# Patient Record
Sex: FEMALE | Race: OTHER | HISPANIC OR LATINO | Employment: FULL TIME | URBAN - METROPOLITAN AREA
[De-identification: names, ages, dates, MRNs, and addresses within clinical notes are randomized per-mention and may not be internally consistent; named-entity substitution may affect disease eponyms.]

---

## 2024-11-13 ENCOUNTER — HOSPITAL ENCOUNTER (EMERGENCY)
Facility: MEDICAL CENTER | Age: 29
End: 2024-11-13
Attending: EMERGENCY MEDICINE

## 2024-11-13 ENCOUNTER — TELEPHONE (OUTPATIENT)
Dept: OBGYN | Facility: CLINIC | Age: 29
End: 2024-11-13

## 2024-11-13 ENCOUNTER — PATIENT OUTREACH (OUTPATIENT)
Dept: SCHEDULING | Facility: IMAGING CENTER | Age: 29
End: 2024-11-13

## 2024-11-13 ENCOUNTER — APPOINTMENT (OUTPATIENT)
Dept: RADIOLOGY | Facility: MEDICAL CENTER | Age: 29
End: 2024-11-13
Attending: EMERGENCY MEDICINE

## 2024-11-13 VITALS
SYSTOLIC BLOOD PRESSURE: 110 MMHG | RESPIRATION RATE: 14 BRPM | HEART RATE: 77 BPM | DIASTOLIC BLOOD PRESSURE: 67 MMHG | OXYGEN SATURATION: 95 % | WEIGHT: 151.9 LBS | TEMPERATURE: 98.1 F

## 2024-11-13 DIAGNOSIS — O20.0 THREATENED MISCARRIAGE: ICD-10-CM

## 2024-11-13 LAB
ALBUMIN SERPL BCP-MCNC: 3.9 G/DL (ref 3.2–4.9)
ALBUMIN/GLOB SERPL: 1.2 G/DL
ALP SERPL-CCNC: 72 U/L (ref 30–99)
ALT SERPL-CCNC: 14 U/L (ref 2–50)
ANION GAP SERPL CALC-SCNC: 7 MMOL/L (ref 7–16)
APPEARANCE UR: CLEAR
AST SERPL-CCNC: 16 U/L (ref 12–45)
B-HCG SERPL-ACNC: ABNORMAL MIU/ML (ref 0–5)
BACTERIA #/AREA URNS HPF: ABNORMAL /HPF
BASOPHILS # BLD AUTO: 0.3 % (ref 0–1.8)
BASOPHILS # BLD: 0.02 K/UL (ref 0–0.12)
BILIRUB SERPL-MCNC: <0.2 MG/DL (ref 0.1–1.5)
BILIRUB UR QL STRIP.AUTO: NEGATIVE
BUN SERPL-MCNC: 15 MG/DL (ref 8–22)
C TRACH DNA SPEC QL NAA+PROBE: NEGATIVE
CALCIUM ALBUM COR SERPL-MCNC: 9.2 MG/DL (ref 8.5–10.5)
CALCIUM SERPL-MCNC: 9.1 MG/DL (ref 8.5–10.5)
CASTS URNS QL MICRO: ABNORMAL /LPF (ref 0–2)
CHLORIDE SERPL-SCNC: 105 MMOL/L (ref 96–112)
CO2 SERPL-SCNC: 21 MMOL/L (ref 20–33)
COLOR UR: YELLOW
CREAT SERPL-MCNC: 0.72 MG/DL (ref 0.5–1.4)
EOSINOPHIL # BLD AUTO: 0.09 K/UL (ref 0–0.51)
EOSINOPHIL NFR BLD: 1.3 % (ref 0–6.9)
EPITHELIAL CELLS 1715: ABNORMAL /HPF (ref 0–5)
ERYTHROCYTE [DISTWIDTH] IN BLOOD BY AUTOMATED COUNT: 47.5 FL (ref 35.9–50)
GFR SERPLBLD CREATININE-BSD FMLA CKD-EPI: 116 ML/MIN/1.73 M 2
GLOBULIN SER CALC-MCNC: 3.2 G/DL (ref 1.9–3.5)
GLUCOSE SERPL-MCNC: 79 MG/DL (ref 65–99)
GLUCOSE UR STRIP.AUTO-MCNC: NEGATIVE MG/DL
HCT VFR BLD AUTO: 39.7 % (ref 37–47)
HGB BLD-MCNC: 12.8 G/DL (ref 12–16)
HIV 1+2 AB+HIV1 P24 AG SERPL QL IA: NORMAL
IMM GRANULOCYTES # BLD AUTO: 0.03 K/UL (ref 0–0.11)
IMM GRANULOCYTES NFR BLD AUTO: 0.4 % (ref 0–0.9)
KETONES UR STRIP.AUTO-MCNC: NEGATIVE MG/DL
LEUKOCYTE ESTERASE UR QL STRIP.AUTO: ABNORMAL
LIPASE SERPL-CCNC: 22 U/L (ref 11–82)
LYMPHOCYTES # BLD AUTO: 1.95 K/UL (ref 1–4.8)
LYMPHOCYTES NFR BLD: 28.7 % (ref 22–41)
MCH RBC QN AUTO: 27.1 PG (ref 27–33)
MCHC RBC AUTO-ENTMCNC: 32.2 G/DL (ref 32.2–35.5)
MCV RBC AUTO: 84.1 FL (ref 81.4–97.8)
MICRO URNS: ABNORMAL
MONOCYTES # BLD AUTO: 0.39 K/UL (ref 0–0.85)
MONOCYTES NFR BLD AUTO: 5.7 % (ref 0–13.4)
N GONORRHOEA DNA SPEC QL NAA+PROBE: NEGATIVE
NEUTROPHILS # BLD AUTO: 4.32 K/UL (ref 1.82–7.42)
NEUTROPHILS NFR BLD: 63.6 % (ref 44–72)
NITRITE UR QL STRIP.AUTO: NEGATIVE
NRBC # BLD AUTO: 0 K/UL
NRBC BLD-RTO: 0 /100 WBC (ref 0–0.2)
NUMBER OF RH DOSES IND 8505RD: NORMAL
PH UR STRIP.AUTO: 5.5 [PH] (ref 5–8)
PLATELET # BLD AUTO: 265 K/UL (ref 164–446)
PMV BLD AUTO: 9.4 FL (ref 9–12.9)
POTASSIUM SERPL-SCNC: 4.1 MMOL/L (ref 3.6–5.5)
PROT SERPL-MCNC: 7.1 G/DL (ref 6–8.2)
PROT UR QL STRIP: NEGATIVE MG/DL
RBC # BLD AUTO: 4.72 M/UL (ref 4.2–5.4)
RBC # URNS HPF: ABNORMAL /HPF (ref 0–2)
RBC UR QL AUTO: ABNORMAL
RH BLD: NORMAL
SODIUM SERPL-SCNC: 133 MMOL/L (ref 135–145)
SP GR UR STRIP.AUTO: 1.02
SPECIMEN SOURCE: NORMAL
T PALLIDUM AB SER QL IA: NORMAL
UROBILINOGEN UR STRIP.AUTO-MCNC: 0.2 EU/DL
WBC # BLD AUTO: 6.8 K/UL (ref 4.8–10.8)
WBC #/AREA URNS HPF: ABNORMAL /HPF

## 2024-11-13 PROCEDURE — 85025 COMPLETE CBC W/AUTO DIFF WBC: CPT

## 2024-11-13 PROCEDURE — 86901 BLOOD TYPING SEROLOGIC RH(D): CPT

## 2024-11-13 PROCEDURE — 84702 CHORIONIC GONADOTROPIN TEST: CPT

## 2024-11-13 PROCEDURE — 76817 TRANSVAGINAL US OBSTETRIC: CPT

## 2024-11-13 PROCEDURE — 83690 ASSAY OF LIPASE: CPT

## 2024-11-13 PROCEDURE — 36415 COLL VENOUS BLD VENIPUNCTURE: CPT

## 2024-11-13 PROCEDURE — 81001 URINALYSIS AUTO W/SCOPE: CPT

## 2024-11-13 PROCEDURE — 99284 EMERGENCY DEPT VISIT MOD MDM: CPT

## 2024-11-13 PROCEDURE — 86780 TREPONEMA PALLIDUM: CPT

## 2024-11-13 PROCEDURE — 87389 HIV-1 AG W/HIV-1&-2 AB AG IA: CPT

## 2024-11-13 PROCEDURE — 87591 N.GONORRHOEAE DNA AMP PROB: CPT

## 2024-11-13 PROCEDURE — 87491 CHLMYD TRACH DNA AMP PROBE: CPT

## 2024-11-13 PROCEDURE — 80053 COMPREHEN METABOLIC PANEL: CPT

## 2024-11-13 ASSESSMENT — COGNITIVE AND FUNCTIONAL STATUS - GENERAL
DAILY ACTIVITIY SCORE: 24
SUGGESTED CMS G CODE MODIFIER DAILY ACTIVITY: CH
MOBILITY SCORE: 24
SUGGESTED CMS G CODE MODIFIER MOBILITY: CH

## 2024-11-13 ASSESSMENT — PAIN DESCRIPTION - PAIN TYPE: TYPE: ACUTE PAIN

## 2024-11-13 NOTE — DISCHARGE INSTRUCTIONS
Rest, no heavy lifting.  Follow-up with GYN in 1 to 2 days.  Return to the ER for more pain, more bleeding, if you have fever, or any other concerns.

## 2024-11-13 NOTE — ED PROVIDER NOTES
ED Provider Note    CHIEF COMPLAINT  Chief Complaint   Patient presents with    Vaginal Bleeding    Abdominal Pain    Painful Urination       EXTERNAL RECORDS REVIEWED  None available.    HPI/ROS  LIMITATION TO HISTORY   Language denies any pressure,  used.  OUTSIDE HISTORIAN(S):  None.    Latisha Fried is a 29 y.o. female G2, P1 presents to the emergency department for evaluation of vaginal bleeding and pain.  Started 3 days ago.  She has been vaginal bleeding and she describes and fluids coming out.  She states she has had some burning discomfort.  Initially was bright red blood.  Little heavier than a period yesterday and now is brown or blood.  She also some pain in her lower abdomen and back.  No fevers chills nausea or vomiting.  1 previous  delivery.  No previous tubal surgery or infections.    PAST MEDICAL HISTORY   Denies any past history.    SURGICAL HISTORY  patient denies any surgical history    FAMILY HISTORY  History reviewed. No pertinent family history.    SOCIAL HISTORY  Social History     Tobacco Use    Smoking status: Never    Smokeless tobacco: Never   Vaping Use    Vaping status: Never Used   Substance and Sexual Activity    Alcohol use: Never    Drug use: Never    Sexual activity: Not on file       CURRENT MEDICATIONS  Home Medications       Reviewed by Amisha Ibarra R.N. (Registered Nurse) on 24 at 0558  Med List Status: Partial     Medication Last Dose Status        Patient Ahsan Taking any Medications                         Audit from Redirected Encounters    **Home medications have not yet been reviewed for this encounter**         ALLERGIES  No Known Allergies    PHYSICAL EXAM  VITAL SIGNS: /64   Pulse 70   Temp 36.8 °C (98.2 °F) (Temporal)   Resp 16   Wt 68.9 kg (151 lb 14.4 oz)   LMP 10/04/2024 (Exact Date)   SpO2 98%    Constitutional: Well developed, Well nourished, No acute distress, Non-toxic appearance.   HENT: Normocephalic,  Atraumatic,  Eyes: PERRL, EOMI, Conjunctiva normal, No discharge.   Neck: Normal range of motion,  Cardiovascular: Normal heart rate, Normal rhythm, No murmurs, No rubs, No gallops.   Thorax & Lungs: Normal breath sounds, No respiratory distress, No wheezing,  Abdomen: Soft, No tenderness  Skin: Warm, Dry, No erythema, No rash.   Back: No tenderness, No CVA tenderness.   Musculoskeletal: Good range of motion in all major joints.  Neurologic: Alert, No focal deficits noted.   Psychiatric: Affect normal      EKG/LABS  Results for orders placed or performed during the hospital encounter of 11/13/24   CBC WITH DIFFERENTIAL    Collection Time: 11/13/24  6:30 AM   Result Value Ref Range    WBC 6.8 4.8 - 10.8 K/uL    RBC 4.72 4.20 - 5.40 M/uL    Hemoglobin 12.8 12.0 - 16.0 g/dL    Hematocrit 39.7 37.0 - 47.0 %    MCV 84.1 81.4 - 97.8 fL    MCH 27.1 27.0 - 33.0 pg    MCHC 32.2 32.2 - 35.5 g/dL    RDW 47.5 35.9 - 50.0 fL    Platelet Count 265 164 - 446 K/uL    MPV 9.4 9.0 - 12.9 fL    Neutrophils-Polys 63.60 44.00 - 72.00 %    Lymphocytes 28.70 22.00 - 41.00 %    Monocytes 5.70 0.00 - 13.40 %    Eosinophils 1.30 0.00 - 6.90 %    Basophils 0.30 0.00 - 1.80 %    Immature Granulocytes 0.40 0.00 - 0.90 %    Nucleated RBC 0.00 0.00 - 0.20 /100 WBC    Neutrophils (Absolute) 4.32 1.82 - 7.42 K/uL    Lymphs (Absolute) 1.95 1.00 - 4.80 K/uL    Monos (Absolute) 0.39 0.00 - 0.85 K/uL    Eos (Absolute) 0.09 0.00 - 0.51 K/uL    Baso (Absolute) 0.02 0.00 - 0.12 K/uL    Immature Granulocytes (abs) 0.03 0.00 - 0.11 K/uL    NRBC (Absolute) 0.00 K/uL   COMP METABOLIC PANEL    Collection Time: 11/13/24  6:30 AM   Result Value Ref Range    Sodium 133 (L) 135 - 145 mmol/L    Potassium 4.1 3.6 - 5.5 mmol/L    Chloride 105 96 - 112 mmol/L    Co2 21 20 - 33 mmol/L    Anion Gap 7.0 7.0 - 16.0    Glucose 79 65 - 99 mg/dL    Bun 15 8 - 22 mg/dL    Creatinine 0.72 0.50 - 1.40 mg/dL    Calcium 9.1 8.5 - 10.5 mg/dL    Correct Calcium 9.2 8.5 - 10.5 mg/dL     AST(SGOT) 16 12 - 45 U/L    ALT(SGPT) 14 2 - 50 U/L    Alkaline Phosphatase 72 30 - 99 U/L    Total Bilirubin <0.2 0.1 - 1.5 mg/dL    Albumin 3.9 3.2 - 4.9 g/dL    Total Protein 7.1 6.0 - 8.2 g/dL    Globulin 3.2 1.9 - 3.5 g/dL    A-G Ratio 1.2 g/dL   LIPASE    Collection Time: 11/13/24  6:30 AM   Result Value Ref Range    Lipase 22 11 - 82 U/L   HCG QUANTITATIVE    Collection Time: 11/13/24  6:30 AM   Result Value Ref Range    Bhcg 83376.0 (H) 0.0 - 5.0 mIU/mL   RH Type for Rhogam from E.D.    Collection Time: 11/13/24  6:30 AM   Result Value Ref Range    Emergency Department Rh Typing POS     Number Of Rh Doses Indicated ZERO    ESTIMATED GFR    Collection Time: 11/13/24  6:30 AM   Result Value Ref Range    GFR (CKD-EPI) 116 >60 mL/min/1.73 m 2   HIV AG/AB COMBO ASSAY SCREENING    Collection Time: 11/13/24  6:30 AM   Result Value Ref Range    HIV Ag/Ab Combo Assay Non-Reactive Non Reactive   T.PALLIDUM AB JOSE (SCREENING)    Collection Time: 11/13/24  6:30 AM   Result Value Ref Range    Syphilis, Treponemal Qual Non-Reactive Non-Reactive   URINALYSIS,CULTURE IF INDICATED    Collection Time: 11/13/24  7:18 AM    Specimen: Urine   Result Value Ref Range    Color Yellow     Character Clear     Specific Gravity 1.017 <1.035    Ph 5.5 5.0 - 8.0    Glucose Negative Negative mg/dL    Ketones Negative Negative mg/dL    Protein Negative Negative mg/dL    Bilirubin Negative Negative    Urobilinogen, Urine 0.2 <=1.0 EU/dL    Nitrite Negative Negative    Leukocyte Esterase Trace (A) Negative    Occult Blood Moderate (A) Negative    Micro Urine Req Microscopic    URINE MICROSCOPIC (W/UA)    Collection Time: 11/13/24  7:18 AM   Result Value Ref Range    WBC 0-2 /hpf    RBC 3-5 (A) 0 - 2 /hpf    Bacteria None Seen None /hpf    Epithelial Cells 0-2 0 - 5 /hpf    Urine Casts 0-2 0 - 2 /lpf   Chlamydia/GC, PCR (Urine)    Collection Time: 11/13/24 11:57 AM    Specimen: Urine   Result Value Ref Range    Source Urine       I have  independently interpreted this EKG    RADIOLOGY/PROCEDURES   I have independently interpreted the diagnostic imaging associated with this visit and am waiting the final reading from the radiologist.       Radiologist interpretation:  US-OB 1ST TRIMESTER WITH TRANSVAGINAL (COMBO)   Final Result      1.  Single living intrauterine pregnancy at 6 weeks1 day estimated gestational age.   2.  Somewhat low fetal heart rate and irregular appearing gestational sac, recommend close follow-up   3.  Large subchorionic hematoma   4.  No IUD seen          COURSE & MEDICAL DECISION MAKING    ASSESSMENT, COURSE AND PLAN  Care Narrative:     20-year-old female presents with first trimester bleeding.  Quantitative hCG is appropriate elevated.  STD tests are ordered per protocol.  Her HIV and syphilis are negative.  CBC CMP unremarkable.  Urinalysis does not show signs of UTI.    Patient's ultrasound shows an IUP but has a slow heart rate.  This may be a miscarriage in process.    Patient is a benign abdominal exam without tenderness or peritonitis doubt appendicitis.  There is no CVA tenderness.    At this point explained the patient think she is likely having a miscarriage also checking a threatened miscarriage.  She is asked to follow-up in 48 hours with OB/GYN.  In the interim to return for pain bleeding or other concerns return for recheck if unable to follow-up.    She is advised to pelvic rest and avoid heavy lifting and return to the ER for pain bleeding or other concerns.  Questions were answered, she is agreeable to plan.  She is referred to GYN and discharge condition.    Safia Barton M.D.  [address] [place] [address]  [phone]    Schedule an appointment as soon as possible for a visit in 2 days                DISPOSITION AND DISCUSSIONS        Barriers to care at this time, including but not limited to: Patient does not have established PCP.         FINAL DIAGNOSIS  1. Threatened miscarriage          Electronically signed by: Rizwan Mills M.D., 11/13/2024 7:30 AM

## 2024-11-13 NOTE — ED TRIAGE NOTES
29 y.o. female Latisha Fried  Chief Complaint   Patient presents with    Vaginal Bleeding    Abdominal Pain    Painful Urination   Patient presented to ED with complaints of abdominal pain , vaginal bleeding and dysuria for 3 days, patient endorses she has IUD in placed. Pt has history of previous  birth with .      2  Term Birth 0  Premature Births 1  Abortions 0  Living Children 1    LMP: 10/04/2024    Pregnancy confirmed with Home pregnancy kit.      OB/GYN: not established    Patient AAO X4 , Respirations even and unlabored on room air , afebrile at this time.     ED RN protocol initiated for Vaginal bleeding.       /78   Pulse 70   Temp 36.8 °C (98.2 °F) (Temporal)   Resp 18   Wt 68.9 kg (151 lb 14.4 oz)   LMP 10/04/2024 (Exact Date)   SpO2 99%     No Known Allergies    Pt made aware of triage process, placed back into lobby, educated pt to tell staff of any worsening of symptoms         2024     6:02 AM    SERVICES   Is patient using  services for this encounter? Yes   Language Interpreted Amharic    Name Zahida [id number]    Mode iPad   Refusal signed by patient? No   Content of Interpretation (select all) History/Visit information;Patient Education

## 2024-11-13 NOTE — ED NOTES
Bedside report with MATTY Espinoza. Pt connected to monitor, on room air.  Pt ambulatory to bathroom to provide urine sample and sample sent to lab.  Pt updated on POC. Call light within reach.

## 2024-11-13 NOTE — TELEPHONE ENCOUNTER
11/13/2024 1546  Prior to call, consulted with Dr Pike who stated that pt should f/u in a week or so in the clinic or at the ER. Ok to double book with her at [facility] on 11/26/2024 if pt wants to establish care with the clinic. Pt to do HBCG prior to appt, Dr Pike will order if pt wants to establish with the clinic.     ID: [id number]  Called pt x2, no answer and not able to LVM  Called Hermilo and SILVANA to have pt call clinic re: ER visit.    11/15/2024 1436   ID: [id number]   Pt stated that she is feeling better and resting, like the doctor told her. She would like to establish care with our clinic.  Asked pt if she has medical insurance. Pt stated that she just moved here and was supposed to start her job on Thursday, but went to the ER on Wednesday. She will talk to HR on Monday. Informed pt that if she has no insurance it will be $120 for the appt and then she will get a bill for any labs or procedures done. Pt agreed and verbalized understanding.  Scheduled for 11/26/2024 at 0830 with Dr Pike,  OK to double book per Dr Pike.  Informed pt that Dr Prieto would like her to get a lab draw HBCG, before her appt that will help us know how her pregnancy is progressing. Informed pt that she can get it drawn at any [facility] lab for $87-90 or she can go to [facility] for $47-50 - she will need a lab slip if she goes to [facility]. Pt can  lab slip on Monday afternoon or Tuesday as Dr Pike is not back in the clinic before Monday.Pt asked if she can get the lab drawn at [facility]. Informed pt that she can get the lab drawn there, but then she is responsible for getting the results to us. Pt agreed and verbalized understanding.

## 2024-11-13 NOTE — ED NOTES
Report given MATTY Velasquez. Endorsing care at this time .      How Severe Are Your Moles?: mild Has Your Mole Been Treated?: been treated Is This A New Presentation, Or A Follow-Up?: Mole Removal

## 2024-11-13 NOTE — ED NOTES
Rounded on pt. Pt resting comfortably in gurney with significant other at bedside. Sami interpretor used to update pt on POC.  Pt and significant other verbalized understanding. Denies any needs at this time. Call light within reach.

## 2024-11-15 DIAGNOSIS — Z34.90 EARLY STAGE OF PREGNANCY: ICD-10-CM

## 2024-11-20 ENCOUNTER — HOSPITAL ENCOUNTER (OUTPATIENT)
Dept: LAB | Facility: MEDICAL CENTER | Age: 29
End: 2024-11-20
Attending: OBSTETRICS & GYNECOLOGY

## 2024-11-20 DIAGNOSIS — Z34.90 EARLY STAGE OF PREGNANCY: ICD-10-CM

## 2024-11-20 LAB — B-HCG SERPL-ACNC: ABNORMAL MIU/ML (ref 0–5)

## 2024-11-20 PROCEDURE — 84702 CHORIONIC GONADOTROPIN TEST: CPT

## 2024-11-20 PROCEDURE — 36415 COLL VENOUS BLD VENIPUNCTURE: CPT

## 2024-11-22 ENCOUNTER — TELEPHONE (OUTPATIENT)
Dept: OBGYN | Facility: CLINIC | Age: 29
End: 2024-11-22

## 2024-11-22 NOTE — TELEPHONE ENCOUNTER
----- Message from Physician Tarsha Pike M.D. sent at 11/22/2024  8:02 AM PST -----  Do you mind letting Latisha know that her HCG is still rising, which supports that this pregnancy may be normal. However, the best way for us to determine this definitively will be an US which we can do th the appt with me next week.     If she has heavy bleeding (> 2pads/hr), severe abdominal pain, or concerns for infection, she should go to the ED before our visit.    ThanksTarsha    11/22/2024 0954   ID: [id number]  Called pt and informed as above. Pt agreed and verbalized understanding. She is planning to be at her appt on 11/26/2024.

## 2024-11-26 ENCOUNTER — OFFICE VISIT (OUTPATIENT)
Dept: OBGYN | Facility: CLINIC | Age: 29
End: 2024-11-26
Payer: COMMERCIAL

## 2024-11-26 ENCOUNTER — APPOINTMENT (OUTPATIENT)
Dept: ADMISSIONS | Facility: MEDICAL CENTER | Age: 29
End: 2024-11-26
Attending: OBSTETRICS & GYNECOLOGY
Payer: COMMERCIAL

## 2024-11-26 ENCOUNTER — ANESTHESIA EVENT (OUTPATIENT)
Dept: SURGERY | Facility: MEDICAL CENTER | Age: 29
End: 2024-11-26
Payer: COMMERCIAL

## 2024-11-26 VITALS
BODY MASS INDEX: 26.93 KG/M2 | DIASTOLIC BLOOD PRESSURE: 66 MMHG | SYSTOLIC BLOOD PRESSURE: 104 MMHG | WEIGHT: 152 LBS | HEIGHT: 63 IN

## 2024-11-26 DIAGNOSIS — O02.1 MISSED ABORTION: Primary | ICD-10-CM

## 2024-11-26 PROCEDURE — 3078F DIAST BP <80 MM HG: CPT | Performed by: OBSTETRICS & GYNECOLOGY

## 2024-11-26 PROCEDURE — 3074F SYST BP LT 130 MM HG: CPT | Performed by: OBSTETRICS & GYNECOLOGY

## 2024-11-26 PROCEDURE — 99203 OFFICE O/P NEW LOW 30 MIN: CPT | Performed by: OBSTETRICS & GYNECOLOGY

## 2024-11-26 RX ORDER — MISOPROSTOL 200 UG/1
TABLET ORAL
Qty: 8 TABLET | Refills: 0 | Status: ON HOLD | OUTPATIENT
Start: 2024-11-26 | End: 2024-11-27

## 2024-11-26 ASSESSMENT — FIBROSIS 4 INDEX: FIB4 SCORE: 0.47

## 2024-11-26 NOTE — PROGRESS NOTES
Patient here for GYN visit.   LMP : 10/4/24  Phone/Pharmacy verified: [phone]  ER  11/13 for vaginal bleeding, had US done - No IUD seen, IUP 6w1d (8w0d today) with irregular appearing gestational sac.   HCG levels rising, supporting normal pregnancy, will do US in office today     PT states she is wondering if she can continue working- I informed her I would ask due to this be an ER follow up and not a NOB appointment        ID [id number]

## 2024-11-26 NOTE — PROGRESS NOTES
[facility]   GYN VISIT NOTE    CC:   Gynecologic Exam (ER follow up )      HPI:   Patient is a 29 y.o.  who presents for follow-up from ED visit for vaginal bleeding in early pregnancy. LMP: 10/3/24    Seen in the ED on 24 for vaginal bleeding in early pregnancy, c/w threatened AB based on US results. Since then state hers bleeding has been light spotting bright not dark brown, but not heavy and no clot. Also having occasional pelvic cramping mostly at afternoon.     HCG trend: 24: 18,315 ---> : 43,802    US results:   1.  Single living intrauterine pregnancy at 6 weeks1 day estimated gestational age.   2.  Somewhat low fetal heart rate and irregular appearing gestational sac, recommend close follow-up   3.  Large subchorionic hematoma   4.  No IUD seen      Denies lightheadedness, dizziness,  and syncope or pre-syncope. Endorses occasional lightheadedness. Occasional nausea, no vomiting.       GYNECOLOGIC HISTORY:   Current Sexual Activity: Yes; # of partners: 1, Partners are:   History of sexually transmitted diseases? no  Abnormal discharge? No  IPV screen: negative     Menstrual History   Patient's last menstrual period was Patient's last menstrual period was 10/04/2024 (exact date).  Periods are regular  q 30 days, lasting 6 days.     Clots or heavy flow: no  Intermenstrual bleeding/spotting: no  Significant pelvic pain associated w/ menses: no  Significant pelvic pain NOT associated w/ menses: no    Contraception  Current contraception:  None     Pap History  Last Pap: unknown  Hx Moderate or Severe Dysplasia : No     OBSTETRIC HISTORY:  OB History    Para Term  AB Living   2 1 1     1   SAB IAB Ectopic Molar Multiple Live Births             1      # Outcome Date GA Lbr Oliver/2nd Weight Sex Type Anes PTL Lv   2 Current            1 Term 22 37w0d  4 lb 14.5 oz M CS-Unspec EPI N SILVA      Birth Comments: 8 month pregancy in [place] - had to stay  "inpatient due to delivery during Covid       MEDICAL HISTORY:  History reviewed. No pertinent past medical history.    SURGICAL HISTORY:  Past Surgical History:   Procedure Laterality Date    PRIMARY C SECTION         FAMILY HISTORY:  Family History   Problem Relation Age of Onset    No Known Problems Mother     No Known Problems Father        ALLERGIES / REACTIONS:  No Known Allergies    SOCIAL HISTORY:   reports that she has never smoked. She has never used smokeless tobacco. She reports that she does not drink alcohol and does not use drugs.    MEDICATIONS:  Current Outpatient Medications on File Prior to Visit   Medication Sig Dispense Refill    Prenatal MV-Min-Fe Fum-FA-DHA (PRENATAL 1 PO) Take  by mouth.       No current facility-administered medications on file prior to visit.     ROS:   Positive ROS: per HPI  Gen: no fevers or chills, no significant weight loss or gain, excessive fatigue  Respiratory:  no cough or dyspnea  Cardiac:  no chest pain, no palpitations, no syncope  GI:  no heartburn, no abdominal pain,  Urinary: no dysuria, urgency, frequency, incontinence   Psych: no depression or anxiety  Neuro: no migraines with aura, fainting spells, numbness or tingling  Extremities: no joint pain, persistently swollen ankles, recurrent leg cramps       PHYSICAL EXAMINATION:  Vital Signs:   Vitals:    11/26/24 0839   Weight: 152 lb   Height: 5' 2.99\"     Body mass index is 26.93 kg/m².  Constitutional: The patient is well developed and well nourished.  Psychiatric: Patient is oriented to time place and person.   Skin: No rash observed.  Neck: Neck appears symmetric.  Respiratory: normal effort  Abdomen: Soft, non-tender, no rebound or guarding  Pelvic Exam: deferred  Extremeties: Legs are symmetric and without tenderness. There is no edema present.    Labs:  Recent Results (from the past 2 weeks)   CBC WITH DIFFERENTIAL    Collection Time: 11/13/24  6:30 AM   Result Value Ref Range    WBC 6.8 4.8 - 10.8 K/uL "    RBC 4.72 4.20 - 5.40 M/uL    Hemoglobin 12.8 12.0 - 16.0 g/dL    Hematocrit 39.7 37.0 - 47.0 %    MCV 84.1 81.4 - 97.8 fL    MCH 27.1 27.0 - 33.0 pg    MCHC 32.2 32.2 - 35.5 g/dL    RDW 47.5 35.9 - 50.0 fL    Platelet Count 265 164 - 446 K/uL    MPV 9.4 9.0 - 12.9 fL    Neutrophils-Polys 63.60 44.00 - 72.00 %    Lymphocytes 28.70 22.00 - 41.00 %    Monocytes 5.70 0.00 - 13.40 %    Eosinophils 1.30 0.00 - 6.90 %    Basophils 0.30 0.00 - 1.80 %    Immature Granulocytes 0.40 0.00 - 0.90 %    Nucleated RBC 0.00 0.00 - 0.20 /100 WBC    Neutrophils (Absolute) 4.32 1.82 - 7.42 K/uL    Lymphs (Absolute) 1.95 1.00 - 4.80 K/uL    Monos (Absolute) 0.39 0.00 - 0.85 K/uL    Eos (Absolute) 0.09 0.00 - 0.51 K/uL    Baso (Absolute) 0.02 0.00 - 0.12 K/uL    Immature Granulocytes (abs) 0.03 0.00 - 0.11 K/uL    NRBC (Absolute) 0.00 K/uL   COMP METABOLIC PANEL    Collection Time: 11/13/24  6:30 AM   Result Value Ref Range    Sodium 133 (L) 135 - 145 mmol/L    Potassium 4.1 3.6 - 5.5 mmol/L    Chloride 105 96 - 112 mmol/L    Co2 21 20 - 33 mmol/L    Anion Gap 7.0 7.0 - 16.0    Glucose 79 65 - 99 mg/dL    Bun 15 8 - 22 mg/dL    Creatinine 0.72 0.50 - 1.40 mg/dL    Calcium 9.1 8.5 - 10.5 mg/dL    Correct Calcium 9.2 8.5 - 10.5 mg/dL    AST(SGOT) 16 12 - 45 U/L    ALT(SGPT) 14 2 - 50 U/L    Alkaline Phosphatase 72 30 - 99 U/L    Total Bilirubin <0.2 0.1 - 1.5 mg/dL    Albumin 3.9 3.2 - 4.9 g/dL    Total Protein 7.1 6.0 - 8.2 g/dL    Globulin 3.2 1.9 - 3.5 g/dL    A-G Ratio 1.2 g/dL   LIPASE    Collection Time: 11/13/24  6:30 AM   Result Value Ref Range    Lipase 22 11 - 82 U/L   HCG QUANTITATIVE    Collection Time: 11/13/24  6:30 AM   Result Value Ref Range    Bhcg 81742.0 (H) 0.0 - 5.0 mIU/mL   RH Type for Rhogam from E.D.    Collection Time: 11/13/24  6:30 AM   Result Value Ref Range    Emergency Department Rh Typing POS     Number Of Rh Doses Indicated ZERO    ESTIMATED GFR    Collection Time: 11/13/24  6:30 AM   Result Value Ref  Range    GFR (CKD-EPI) 116 >60 mL/min/1.73 m 2   HIV AG/AB COMBO ASSAY SCREENING    Collection Time: 11/13/24  6:30 AM   Result Value Ref Range    HIV Ag/Ab Combo Assay Non-Reactive Non Reactive   T.PALLIDUM AB JOSE (SCREENING)    Collection Time: 11/13/24  6:30 AM   Result Value Ref Range    Syphilis, Treponemal Qual Non-Reactive Non-Reactive   URINALYSIS,CULTURE IF INDICATED    Collection Time: 11/13/24  7:18 AM    Specimen: Urine   Result Value Ref Range    Color Yellow     Character Clear     Specific Gravity 1.017 <1.035    Ph 5.5 5.0 - 8.0    Glucose Negative Negative mg/dL    Ketones Negative Negative mg/dL    Protein Negative Negative mg/dL    Bilirubin Negative Negative    Urobilinogen, Urine 0.2 <=1.0 EU/dL    Nitrite Negative Negative    Leukocyte Esterase Trace (A) Negative    Occult Blood Moderate (A) Negative    Micro Urine Req Microscopic    URINE MICROSCOPIC (W/UA)    Collection Time: 11/13/24  7:18 AM   Result Value Ref Range    WBC 0-2 /hpf    RBC 3-5 (A) 0 - 2 /hpf    Bacteria None Seen None /hpf    Epithelial Cells 0-2 0 - 5 /hpf    Urine Casts 0-2 0 - 2 /lpf   Chlamydia/GC, PCR (Urine)    Collection Time: 11/13/24 11:57 AM    Specimen: Urine   Result Value Ref Range    C. trachomatis by PCR Negative Negative    Gc By Dna Probe Negative Negative    Source Urine    HCG QUANTITATIVE    Collection Time: 11/20/24 11:49 AM   Result Value Ref Range    Bhcg 45073.0 (H) 0.0 - 5.0 mIU/mL       Imaging    First Trimester US performed today:    Type of US Transvaginal   LMP  10/3/24   Fetal Number  1   Yolk Sac Seen and abnormal appearing   CRL 4.2 mm   Gestational age by CRL  6w1d   Cardiac Activity Absent   Cervix/Uterus/Gestational sac Normal uterine echotexture and grossly normal shape.  Normal cervix.   Abnormal  appearance of the gestational sac.   Location of pregnancy Intrauterine   Adnexa and bladder Right ovary:Normal   Left ovary:Normal   Corpus Luteum - Not Seen  Bladder: Normal   Final Diagnosis  Missed    Final FABBY  N/A   The above is my interpretation of the US that I personally performed    ASSESSMENT AND PLAN:  29 y.o.   who presents for care for recently diagnosed pregnancy of known location.       #Missed   Diagnosis made by US today demonstrating no interval growth of fetal pole since US > 7 days ago and absent FCA when this was present before.    Discussed the diagnosis of missed  with the patient. I explained the ultrasound findings to the patient. We explicitly discussed that the most common etiology of first trimester pregnancy loss is random genetic error. Discussed that miscarriage is not behavioral and that the patient is not to blame.     I also discussed the management of missed  with the patient. I explained that the majority of women will pass the pregnancy spontaneously within 4 weeks and 80% within 8 weeks. I explained that with expectant management, there is a risk that she may not pass the pregnancy at all or incompletely, or she may bleed heavily, requiring further intervention. We discussed the option of medical management. I explained the success rates of passing the pregnancy completely with medical management are >95% with mife/miso and slightly less with miso alone, but that the risks are similar to expectant management in that she may not pass the pregnancy at all, or she may pass it incompletely and/or bleed heavily, requiring further intervention. We lastly reviewed surgical management. I explained that this is a definitive treatment with the highest success rate and often fastest to complete management, but that it typically performed with sedation/anesthesia, and carries a small risk of the procedure including bleeding, infection,injury to the uterus/surrounding organs, and lowest risk of retained tissue.     The patient wants procedural management, but wants to look into cost. Kenisha agreed to call her with information. In the mean time, I  prescribed misoprostol for medical management in case as a back-up plan. Reviewed how to take this and bleeding and infection precautions. 800 mcg vaginally, repeat 3 hours later if needed.    We reviewed the expected return of menses and when it is appropriate to TTC again if desired. declines  contraception.     Her ABO/Rh type is pos.(Per SFP only if >12 wga)    Will do follow-up pending final management.    Remote  # [id number] was utilized for this entire    Tarsha Pike MD  Obstetrics and Gynecology

## 2024-11-26 NOTE — LETTER
November 26, 2024         Patient: Latisha Fried   YOB: 1995   Date of Visit: 11/26/2024           To Whom it May Concern:    Latisha Fried was seen in my clinic on 11/26/2024. She may return to work on 11/28/24. In my medial opinion and after evaluation she will need to be excused from work 11/13/24-11/27/24, and return on or after 11/28/24 as stated before.      If you have any questions or concerns, please don't hesitate to call.        Sincerely,           Tarsha Pike M.D.  Electronically Signed

## 2024-11-27 ENCOUNTER — ANESTHESIA (OUTPATIENT)
Dept: SURGERY | Facility: MEDICAL CENTER | Age: 29
End: 2024-11-27
Payer: COMMERCIAL

## 2024-11-27 ENCOUNTER — HOSPITAL ENCOUNTER (OUTPATIENT)
Facility: MEDICAL CENTER | Age: 29
End: 2024-11-27
Attending: OBSTETRICS & GYNECOLOGY | Admitting: OBSTETRICS & GYNECOLOGY
Payer: COMMERCIAL

## 2024-11-27 ENCOUNTER — PHARMACY VISIT (OUTPATIENT)
Dept: PHARMACY | Facility: MEDICAL CENTER | Age: 29
End: 2024-11-27
Payer: COMMERCIAL

## 2024-11-27 VITALS
WEIGHT: 149.25 LBS | HEART RATE: 64 BPM | OXYGEN SATURATION: 97 % | RESPIRATION RATE: 18 BRPM | HEIGHT: 63 IN | BODY MASS INDEX: 26.45 KG/M2 | SYSTOLIC BLOOD PRESSURE: 112 MMHG | DIASTOLIC BLOOD PRESSURE: 58 MMHG | TEMPERATURE: 97.6 F

## 2024-11-27 LAB
ABO + RH BLD: NORMAL
ABO GROUP BLD: NORMAL
BLD GP AB SCN SERPL QL: NORMAL
PATHOLOGY CONSULT NOTE: NORMAL
RH BLD: NORMAL

## 2024-11-27 PROCEDURE — 160029 HCHG SURGERY MINUTES - 1ST 30 MINS LEVEL 4: Performed by: OBSTETRICS & GYNECOLOGY

## 2024-11-27 PROCEDURE — 88305 TISSUE EXAM BY PATHOLOGIST: CPT

## 2024-11-27 PROCEDURE — 160046 HCHG PACU - 1ST 60 MINS PHASE II: Performed by: OBSTETRICS & GYNECOLOGY

## 2024-11-27 PROCEDURE — 86900 BLOOD TYPING SEROLOGIC ABO: CPT | Mod: 91

## 2024-11-27 PROCEDURE — 700105 HCHG RX REV CODE 258: Performed by: STUDENT IN AN ORGANIZED HEALTH CARE EDUCATION/TRAINING PROGRAM

## 2024-11-27 PROCEDURE — RXMED WILLOW AMBULATORY MEDICATION CHARGE: Performed by: OBSTETRICS & GYNECOLOGY

## 2024-11-27 PROCEDURE — 86850 RBC ANTIBODY SCREEN: CPT

## 2024-11-27 PROCEDURE — A9270 NON-COVERED ITEM OR SERVICE: HCPCS | Performed by: STUDENT IN AN ORGANIZED HEALTH CARE EDUCATION/TRAINING PROGRAM

## 2024-11-27 PROCEDURE — 36415 COLL VENOUS BLD VENIPUNCTURE: CPT

## 2024-11-27 PROCEDURE — 700111 HCHG RX REV CODE 636 W/ 250 OVERRIDE (IP): Mod: JZ | Performed by: STUDENT IN AN ORGANIZED HEALTH CARE EDUCATION/TRAINING PROGRAM

## 2024-11-27 PROCEDURE — 160009 HCHG ANES TIME/MIN: Performed by: OBSTETRICS & GYNECOLOGY

## 2024-11-27 PROCEDURE — 59820 CARE OF MISCARRIAGE: CPT | Performed by: OBSTETRICS & GYNECOLOGY

## 2024-11-27 PROCEDURE — 160047 HCHG PACU  - EA ADDL 30 MINS PHASE II: Performed by: OBSTETRICS & GYNECOLOGY

## 2024-11-27 PROCEDURE — 160025 RECOVERY II MINUTES (STATS): Performed by: OBSTETRICS & GYNECOLOGY

## 2024-11-27 PROCEDURE — 700102 HCHG RX REV CODE 250 W/ 637 OVERRIDE(OP): Performed by: STUDENT IN AN ORGANIZED HEALTH CARE EDUCATION/TRAINING PROGRAM

## 2024-11-27 PROCEDURE — 86901 BLOOD TYPING SEROLOGIC RH(D): CPT | Mod: 91

## 2024-11-27 PROCEDURE — 700101 HCHG RX REV CODE 250: Performed by: STUDENT IN AN ORGANIZED HEALTH CARE EDUCATION/TRAINING PROGRAM

## 2024-11-27 PROCEDURE — 160002 HCHG RECOVERY MINUTES (STAT): Performed by: OBSTETRICS & GYNECOLOGY

## 2024-11-27 PROCEDURE — 160048 HCHG OR STATISTICAL LEVEL 1-5: Performed by: OBSTETRICS & GYNECOLOGY

## 2024-11-27 PROCEDURE — 160035 HCHG PACU - 1ST 60 MINS PHASE I: Performed by: OBSTETRICS & GYNECOLOGY

## 2024-11-27 RX ORDER — ONDANSETRON 2 MG/ML
4 INJECTION INTRAMUSCULAR; INTRAVENOUS
Status: DISCONTINUED | OUTPATIENT
Start: 2024-11-27 | End: 2024-11-27 | Stop reason: HOSPADM

## 2024-11-27 RX ORDER — SODIUM CHLORIDE, SODIUM LACTATE, POTASSIUM CHLORIDE, CALCIUM CHLORIDE 600; 310; 30; 20 MG/100ML; MG/100ML; MG/100ML; MG/100ML
INJECTION, SOLUTION INTRAVENOUS
Status: DISCONTINUED | OUTPATIENT
Start: 2024-11-27 | End: 2024-11-27 | Stop reason: SURG

## 2024-11-27 RX ORDER — HYDROMORPHONE HYDROCHLORIDE 1 MG/ML
0.2 INJECTION, SOLUTION INTRAMUSCULAR; INTRAVENOUS; SUBCUTANEOUS
Status: DISCONTINUED | OUTPATIENT
Start: 2024-11-27 | End: 2024-11-27 | Stop reason: HOSPADM

## 2024-11-27 RX ORDER — MISOPROSTOL 200 UG/1
TABLET ORAL
Status: DISCONTINUED
Start: 2024-11-27 | End: 2024-11-27 | Stop reason: HOSPADM

## 2024-11-27 RX ORDER — EPHEDRINE SULFATE 50 MG/ML
5 INJECTION, SOLUTION INTRAVENOUS
Status: DISCONTINUED | OUTPATIENT
Start: 2024-11-27 | End: 2024-11-27 | Stop reason: HOSPADM

## 2024-11-27 RX ORDER — DOXYCYCLINE 100 MG/10ML
INJECTION, POWDER, LYOPHILIZED, FOR SOLUTION INTRAVENOUS
Status: COMPLETED
Start: 2024-11-27 | End: 2024-11-27

## 2024-11-27 RX ORDER — HYDRALAZINE HYDROCHLORIDE 20 MG/ML
5 INJECTION INTRAMUSCULAR; INTRAVENOUS
Status: DISCONTINUED | OUTPATIENT
Start: 2024-11-27 | End: 2024-11-27 | Stop reason: HOSPADM

## 2024-11-27 RX ORDER — METHYLERGONOVINE MALEATE 0.2 MG/ML
INJECTION INTRAVENOUS
Status: DISCONTINUED
Start: 2024-11-27 | End: 2024-11-27 | Stop reason: HOSPADM

## 2024-11-27 RX ORDER — OXYTOCIN 10 [USP'U]/ML
INJECTION, SOLUTION INTRAMUSCULAR; INTRAVENOUS
Status: DISCONTINUED
Start: 2024-11-27 | End: 2024-11-27 | Stop reason: HOSPADM

## 2024-11-27 RX ORDER — IBUPROFEN 600 MG/1
600 TABLET, FILM COATED ORAL EVERY 6 HOURS PRN
Qty: 20 TABLET | Refills: 0 | Status: SHIPPED | OUTPATIENT
Start: 2024-11-27

## 2024-11-27 RX ORDER — SCOLOPAMINE TRANSDERMAL SYSTEM 1 MG/1
1 PATCH, EXTENDED RELEASE TRANSDERMAL
Status: DISCONTINUED | OUTPATIENT
Start: 2024-11-27 | End: 2024-11-27 | Stop reason: HOSPADM

## 2024-11-27 RX ORDER — ACETAMINOPHEN 500 MG
1000 TABLET ORAL ONCE
Status: COMPLETED | OUTPATIENT
Start: 2024-11-27 | End: 2024-11-27

## 2024-11-27 RX ORDER — ACETAMINOPHEN 325 MG/1
650 TABLET ORAL EVERY 6 HOURS PRN
Qty: 20 TABLET | Refills: 0 | Status: SHIPPED | OUTPATIENT
Start: 2024-11-27

## 2024-11-27 RX ORDER — ALBUTEROL SULFATE 5 MG/ML
2.5 SOLUTION RESPIRATORY (INHALATION)
Status: DISCONTINUED | OUTPATIENT
Start: 2024-11-27 | End: 2024-11-27 | Stop reason: HOSPADM

## 2024-11-27 RX ORDER — MIDAZOLAM HYDROCHLORIDE 1 MG/ML
INJECTION INTRAMUSCULAR; INTRAVENOUS PRN
Status: DISCONTINUED | OUTPATIENT
Start: 2024-11-27 | End: 2024-11-27 | Stop reason: SURG

## 2024-11-27 RX ORDER — HALOPERIDOL 5 MG/ML
1 INJECTION INTRAMUSCULAR
Status: DISCONTINUED | OUTPATIENT
Start: 2024-11-27 | End: 2024-11-27 | Stop reason: HOSPADM

## 2024-11-27 RX ORDER — HYDROMORPHONE HYDROCHLORIDE 1 MG/ML
0.1 INJECTION, SOLUTION INTRAMUSCULAR; INTRAVENOUS; SUBCUTANEOUS
Status: DISCONTINUED | OUTPATIENT
Start: 2024-11-27 | End: 2024-11-27 | Stop reason: HOSPADM

## 2024-11-27 RX ORDER — OXYCODONE HCL 5 MG/5 ML
5 SOLUTION, ORAL ORAL
Status: DISCONTINUED | OUTPATIENT
Start: 2024-11-27 | End: 2024-11-27 | Stop reason: HOSPADM

## 2024-11-27 RX ORDER — LABETALOL HYDROCHLORIDE 5 MG/ML
5 INJECTION, SOLUTION INTRAVENOUS
Status: DISCONTINUED | OUTPATIENT
Start: 2024-11-27 | End: 2024-11-27 | Stop reason: HOSPADM

## 2024-11-27 RX ORDER — METOPROLOL TARTRATE 1 MG/ML
1 INJECTION, SOLUTION INTRAVENOUS
Status: DISCONTINUED | OUTPATIENT
Start: 2024-11-27 | End: 2024-11-27 | Stop reason: HOSPADM

## 2024-11-27 RX ORDER — MEPERIDINE HYDROCHLORIDE 25 MG/ML
6.25 INJECTION INTRAMUSCULAR; INTRAVENOUS; SUBCUTANEOUS
Status: DISCONTINUED | OUTPATIENT
Start: 2024-11-27 | End: 2024-11-27 | Stop reason: HOSPADM

## 2024-11-27 RX ORDER — KETOROLAC TROMETHAMINE 15 MG/ML
INJECTION, SOLUTION INTRAMUSCULAR; INTRAVENOUS PRN
Status: DISCONTINUED | OUTPATIENT
Start: 2024-11-27 | End: 2024-11-27 | Stop reason: SURG

## 2024-11-27 RX ORDER — DEXAMETHASONE SODIUM PHOSPHATE 4 MG/ML
INJECTION, SOLUTION INTRA-ARTICULAR; INTRALESIONAL; INTRAMUSCULAR; INTRAVENOUS; SOFT TISSUE PRN
Status: DISCONTINUED | OUTPATIENT
Start: 2024-11-27 | End: 2024-11-27 | Stop reason: SURG

## 2024-11-27 RX ORDER — SODIUM CHLORIDE, SODIUM LACTATE, POTASSIUM CHLORIDE, CALCIUM CHLORIDE 600; 310; 30; 20 MG/100ML; MG/100ML; MG/100ML; MG/100ML
INJECTION, SOLUTION INTRAVENOUS CONTINUOUS
Status: DISCONTINUED | OUTPATIENT
Start: 2024-11-27 | End: 2024-11-27 | Stop reason: HOSPADM

## 2024-11-27 RX ORDER — DIPHENHYDRAMINE HYDROCHLORIDE 50 MG/ML
12.5 INJECTION INTRAMUSCULAR; INTRAVENOUS
Status: DISCONTINUED | OUTPATIENT
Start: 2024-11-27 | End: 2024-11-27 | Stop reason: HOSPADM

## 2024-11-27 RX ORDER — HYDROMORPHONE HYDROCHLORIDE 1 MG/ML
0.4 INJECTION, SOLUTION INTRAMUSCULAR; INTRAVENOUS; SUBCUTANEOUS
Status: DISCONTINUED | OUTPATIENT
Start: 2024-11-27 | End: 2024-11-27 | Stop reason: HOSPADM

## 2024-11-27 RX ORDER — DOXYCYCLINE 100 MG/10ML
INJECTION, POWDER, LYOPHILIZED, FOR SOLUTION INTRAVENOUS PRN
Status: DISCONTINUED | OUTPATIENT
Start: 2024-11-27 | End: 2024-11-27 | Stop reason: SURG

## 2024-11-27 RX ORDER — LIDOCAINE HYDROCHLORIDE 20 MG/ML
INJECTION, SOLUTION EPIDURAL; INFILTRATION; INTRACAUDAL; PERINEURAL PRN
Status: DISCONTINUED | OUTPATIENT
Start: 2024-11-27 | End: 2024-11-27 | Stop reason: SURG

## 2024-11-27 RX ORDER — ONDANSETRON 2 MG/ML
INJECTION INTRAMUSCULAR; INTRAVENOUS PRN
Status: DISCONTINUED | OUTPATIENT
Start: 2024-11-27 | End: 2024-11-27 | Stop reason: SURG

## 2024-11-27 RX ORDER — OXYCODONE HCL 5 MG/5 ML
10 SOLUTION, ORAL ORAL
Status: DISCONTINUED | OUTPATIENT
Start: 2024-11-27 | End: 2024-11-27 | Stop reason: HOSPADM

## 2024-11-27 RX ADMIN — SODIUM CHLORIDE, POTASSIUM CHLORIDE, SODIUM LACTATE AND CALCIUM CHLORIDE: 600; 310; 30; 20 INJECTION, SOLUTION INTRAVENOUS at 14:39

## 2024-11-27 RX ADMIN — FENTANYL CITRATE 25 MCG: 50 INJECTION, SOLUTION INTRAMUSCULAR; INTRAVENOUS at 14:48

## 2024-11-27 RX ADMIN — DOXYCYCLINE 100 MG: 100 INJECTION, POWDER, LYOPHILIZED, FOR SOLUTION INTRAVENOUS at 14:52

## 2024-11-27 RX ADMIN — ONDANSETRON 4 MG: 2 INJECTION INTRAMUSCULAR; INTRAVENOUS at 15:05

## 2024-11-27 RX ADMIN — KETOROLAC TROMETHAMINE 15 MG: 15 INJECTION, SOLUTION INTRAMUSCULAR; INTRAVENOUS at 15:05

## 2024-11-27 RX ADMIN — PROPOFOL 150 MG: 10 INJECTION, EMULSION INTRAVENOUS at 14:48

## 2024-11-27 RX ADMIN — SCOPOLAMINE 1 PATCH: 1.5 PATCH, EXTENDED RELEASE TRANSDERMAL at 14:29

## 2024-11-27 RX ADMIN — DEXAMETHASONE SODIUM PHOSPHATE 4 MG: 4 INJECTION INTRA-ARTICULAR; INTRALESIONAL; INTRAMUSCULAR; INTRAVENOUS; SOFT TISSUE at 14:53

## 2024-11-27 RX ADMIN — MIDAZOLAM HYDROCHLORIDE 2 MG: 1 INJECTION, SOLUTION INTRAMUSCULAR; INTRAVENOUS at 14:42

## 2024-11-27 RX ADMIN — LIDOCAINE HYDROCHLORIDE 80 MG: 20 INJECTION, SOLUTION EPIDURAL; INFILTRATION; INTRACAUDAL; PERINEURAL at 14:48

## 2024-11-27 RX ADMIN — ACETAMINOPHEN 1000 MG: 500 TABLET ORAL at 14:29

## 2024-11-27 ASSESSMENT — PAIN DESCRIPTION - PAIN TYPE
TYPE: ACUTE PAIN
TYPE: SURGICAL PAIN

## 2024-11-27 ASSESSMENT — FIBROSIS 4 INDEX: FIB4 SCORE: 0.47

## 2024-11-27 NOTE — H&P
Please see clinic note from 24 for details.    No updates since note. Reviewed procedure with patient as below.       Latisha Fried desires care for missed  with Dilation and Curettage/uterine aspiration.   The risks of the procedure were discussed, including:   -  Bleeding: This can rarely be enough to require a blood transfusion.  Rare risk of transfusion includes reaction (mild or serious) and exceedingly rare risk of infection (hepatitis, HIV, etc..). Very rare risk of other procedures including hysterectomy to treat severe bleeding.  - Infection: This is very uncommon, <1% with use of sterile technique and pre-operative antibiotic.  -  Perforation: risk is < 1%. Management should this occur can include extended observation in the PACU to rule out bleeding. If a more worrisome perforation is made, laparoscopy/laparotomy/cystoscopy may need to be performed to evaluate for bladder, bowel or blood vessel injury and to address such injuries.  - Retained products of conception: rare. Given signs and symptoms to look out for should this occur.   - Complications of anesthesia: pneumonia, aspiration, even death (very rare, to be discussed with the anesthesiologist)    Consent was signed and witnessed . We reviewed that she can expect to go home the same day as the procedure with minimal (if any) pain medicine needed.     Remote  [id number].    Tarsha Pike MD  Obstetrics and Gynecology

## 2024-11-27 NOTE — OP REPORT
D&C Operative Report    Patient Name: Latisha Fried  YOB: 1995  MRN: [id number]    DATE OF SURGERY: 2024    SURGEON: Tarsha Pike MD    ASSISTANT(S): None     PREOPERATIVE DIAGNOSIS:   Intrauterine pregnancy at 6 weeks.   2.   Missed      POSTOPERATIVE DIAGNOSIS:   Same as preoperative diagnosis     OPERATION PERFORMED: Exam under anesthesia, Dilation and curettage     ESTIMATED BLOOD LOSS:   50 ml    URINE OUTPUT:  No measured    IV FLUIDS:  400 ml crystalloid    COMPLICATIONS: None.    INDICATION FOR PROCEDURE:   The patient is a 29 year-old  who presents for D&C for missed  measuring approximately 6 weeks. She was seen in the ED initially for bleeding in early pregnancy and this demonstrated an 6 week pregnancy with an abnormal GS and subchorionic hemorrhage.  Repeat US in clinic > 7 days later demonstrated an intrauterine pregnancy with a fetal pole with no interval growth and no cardiac activity, diagnostic of a missed . After counseling on options, the patient  desired management with D&C. She states that she is sure of her decision, and it is her own decision.  Risk, benefits, and alternatives of the procedure(s) were reviewed and consents signed.    FINDINGS:   1. Anteverted uterus approximately 6 weeks size, no adnexal masses palpated  2. At the end of the procedure, characteristic gritty texture of endometrium c/w complete procedure and no retained POC.  3. Inspection of removed tissue, c/w 6 wga products of conception.     DESCRIPTION OF OPERATION:   Patient was taken to the operating room, where general anesthesia was completed without difficulty. The patient was carefully placed in dorsal lithotomy position with yellow-fins. An examination under anesthesia was performed with findings as above. The cervix was noted to be mildly dilated. The patient was then prepped and draped in the normal sterile fashion. A time-out was performed to  confirm correct patient, procedure, and location. Doxycyline was confirmed to be running for infection prophylaxis.     A speculum was placed in the vagina and the cervix was visualized. A single-tooth tenaculum was placed on the anterior lip of the cervix.  The cervix was dilated with serially enlarging hegar dilators to 8 mm to achieve adequate dilation. A 8mm rigid curved cannula was then introduced to the uterus and placed to the mid-uterus. With rotating of the cannula and electric suction, all products of conception from the uterus. Utilizing the a 8 mm cannula and suction, a characteristic gritty texture was noted on all walls of the uterus confirming no remaining POC. Bleeding was assessed and noted to be minimal with good uterine tone. All instruments were removed.  Counts were correct. The patient was then awakened and taken to the recovery room in stable condition.     The uterine contents were evaluated and floated, c/w complete 6 week sac.    PATHOLOGY:  Products of conception     DISPOSITION: PACU - hemodynamically stable.    Tarsha Pike MD  Obstetrics and Gynecology

## 2024-11-27 NOTE — ANESTHESIA PROCEDURE NOTES
Airway    Date/Time: 11/27/2024 2:50 PM    Performed by: Kailash Villasenor D.O.  Authorized by: Kailash Villasenor D.O.    Location:  OR  Urgency:  Elective  Indications for Airway Management:  Anesthesia      Spontaneous Ventilation: absent    Sedation Level:  Deep  Preoxygenated: Yes    Patient Position:  Sniffing  MILS Maintained Throughout: No    Mask Difficulty Assessment:  0 - not attempted  Final Airway Type:  Supraglottic airway  Final Supraglottic Airway:  Standard LMA    SGA Size:  3  Number of Attempts at Approach:  1  Ventilation Between Attempts:  None  Number of Other Approaches Attempted:  0

## 2024-11-27 NOTE — ANESTHESIA TIME REPORT
Anesthesia Start and Stop Event Times       Date Time Event    11/27/2024 1406 Ready for Procedure     1444 Anesthesia Start     1514 Anesthesia Stop          Responsible Staff  11/27/24      Name Role Begin End    Kailash Villasenor D.O. Anesth 1444 1514          Overtime Reason:  no overtime (within assigned shift)    Comments:

## 2024-11-27 NOTE — DISCHARGE INSTR - OTHER INFO
Discharge Instructions    24-Hour Contact Information  Monday-Friday, 8:00am - 5:00pm: [facility] at [phone]   After hours: Call the [facility] at [phone] and let them know you are a patient of Dr. Pike and the procedure you had. They will help direct your care.    Immediate emergency: Call 911 in cases of hemorrhage or other immediate emergency       When to go to an Emergency Room:   If you think you are hemorrhaging: blood is “just pouring out” and you are feeling dizzy, weak, or light-headed, call an ambulance with 911 and go immediately to the nearest emergency room.  If you are bleeding heavily, but are not sure if it is too much, call the emergency telephone numbers above and discuss it with a doctor.  They may direct you to an emergency room.  If you cannot reach a doctor and you are concerned, go to the emergency room.     What to expect when you go home  - If you received sedation or anesthesia: No driving or operating heavy machinery for 24 hours. Do not make any important legal decisions in the next 24 hours because your judgement may be impaired.   - You may feel tired for the next 1-2 days. This is normal and should resolve quickly.    How active can I be?  - You should go home and rest today.  You may resume normal activities tomorrow, but avoid an activity if it causes increased pain.  Avoid intercourse for two weeks.   - Most people want at least 1-2 days off work. You may return when you feel able to comfortably and safely perform the functions of your work. Usually this is no longer than 1 week after the procedure.    What can I eat?  - Upon discharge from the hospital you may resume your normal preoperative diet. Depending on how you are feeling and whether you have nausea or not, you may wish to stay with a bland diet for the first few days. However, you can advance this as quickly as you feel ready.    When can I drive?  -You may drive  whenever you are off opiate pain medications and are able to perform the activities needed to drive, i.e. turning, bending, twisting, etc.    Will I have bleeding and cramps?  - You may bleed only one day, or off and on for 1-2 weeks after your procedure.  The flow may vary from very light to fairly heavy.  It may increase with exercise and decrease with rest.    - Please only use pads for 2 weeks. Do not use tampons  - You may experience cramps for a few days.  Ibuprofen (Advil, Motrin, etc.) or Tylenol, rest, and a hot water bottle or heating pad on the abdomen are helpful for relieving cramps.  - Pain medications you should take: Ibuprofen 600 mg every 6-8 hours and tylenol 500-650 mg every 6-8 hours as needed.   - If you pain does not improve within a few days after the procedure or is severe, please call your physician    How can I avoid infection?  - To decrease the chance of getting a serious pelvic infection:  - You were given azithromycin at the time of your laminaria placement and doxycycline in the Or.   - Do not put anything in your vagina for two weeks, including:  - No vaginal intercourse  - No tampons (use sanitary pads only)   - No douching  - No baths. You may shower, but do not sit in a tub of water for 2 weeks    When will I have my next menstrual period?  - Your next menstrual period should begin in 4 to 7 weeks from the day of your procedure.  You can get pregnant before then and should use birth control when you resume sexual intercourse.  If you take the pill, your period will begin after you complete the first package.    Do I need to schedule a follow-up appointment?  - Dr. Pike will schedule a follow-up for you in 4-6 weeks. If you do not have this appt at the time of discharge, her office will call you in several days  - If you need to be seen sooner, please call the office at the number listed at the top of this document.     If I am getting testing done on the pregnancy tissue, when  will this result?  - It depends on the type(s) of tests, but you doctor will call you when the results are back or set up an appointment to discuss them with you.      Reasons to call you physician urgently or present to the Emergency room:  - Questions (please wait for business hours for non-emergent questions)  - Bleeding more than 1 pad an hour for 2 hours in a row  - Fever over 100° or chills  - Foul-smelling vaginal discharge or increasing abdominal pain  - Abdominal pain unrelieved by pain medication          [phone] is a free, confidential talk line that provides post- and post-pregnancy loss counseling for women and an opportunity to discuss their experiences without judgment.

## 2024-11-27 NOTE — OR NURSING
1512 patient arrived from OR, report received, attached to monitoring. VSS, patient oxygenating well on 6 L simple mask, peripad in place, clean/dry/intact on arrival, OPA in place    1520 OPA dc     1522 patient denies pain and nausea, returns to sleep     1550 patient eating crackers    1551 spouseHermilo brought to bedside     1609 dc instructions discussed with patient and patient's spouse, questions answered, verbalized understanding     1619 patient getting dressed with assistance    1641 patient dc in stable condition at this time, vss, peripad with minimal drainage, piv dc tip intact, all belongings with patient and accounted for, safety ensured, safely dc home to care of family

## 2024-11-27 NOTE — OR NURSING
6773 Introduced self to patient. Discussed plan of care. Surgical consent signed. IV inserted without difficulty. Pre op complete.

## 2024-11-27 NOTE — LETTER
November 27, 2024    To Whom It May Concern:         This is confirmation that Latisha Fried was receiving care with myself from 11/26/24 to 11/27/24. Due to the medical condition and care, she cannot return to work until 12/2/24. When she returns to work on 12/2/24, she will not have any restrictions.         If you have any questions please do not hesitate to call me at the phone number listed below.    Sincerely,          Tarsha Pike M.D.

## 2024-11-27 NOTE — ANESTHESIA PREPROCEDURE EVALUATION
Case: [id number] Date/Time: 24 1445    Procedure: DILATION AND CURETTAGE    Pre-op diagnosis: MISSED     Location: [facility] ROOM 25 / SURGERY SAME DAY [facility]    Surgeons: CARLTON Bender H&P:  PAST MEDICAL HISTORY:   29 y.o. female who presents for Procedure(s):  DILATION AND CURETTAGE.  She has current and past medical problems significant for:    No past medical history on file.    SMOKING/ALCOHOL/RECREATIONAL DRUG USE:  Social History     Tobacco Use    Smoking status: Never    Smokeless tobacco: Never   Vaping Use    Vaping status: Never Used   Substance Use Topics    Alcohol use: Never    Drug use: Never     Social History     Substance and Sexual Activity   Drug Use Never       PAST SURGICAL HISTORY:  Past Surgical History:   Procedure Laterality Date    PRIMARY C SECTION         ALLERGIES:   No Known Allergies    MEDICATIONS:  No current facility-administered medications on file prior to encounter.     Current Outpatient Medications on File Prior to Encounter   Medication Sig Dispense Refill    Prenatal MV-Min-Fe Fum-FA-DHA (PRENATAL 1 PO) Take  by mouth.      miSOPROStol (CYTOTEC) 200 MCG Tab Place 4 tabs at once time in the vagina. Repeat with 4 more tabs in the vagina 3 hours later. 8 Tablet 0       LABS:  Lab Results   Component Value Date/Time    HEMOGLOBIN 12.8 2024 0630    HEMATOCRIT 39.7 2024 0630    WBC 6.8 2024 0630     Lab Results   Component Value Date/Time    SODIUM 133 (L) 2024 0630    POTASSIUM 4.1 2024 0630    CHLORIDE 105 2024 0630    CO2 21 2024 0630    GLUCOSE 79 2024 0630    BUN 15 2024 0630    CALCIUM 9.1 2024 0630         PREVIOUS ANESTHETICS: See EMR  __________________________________________    Relevant Problems   No relevant active problems       Physical Exam    Airway   Mallampati: II  TM distance: >3 FB  Neck ROM: full       Cardiovascular - normal exam  Rhythm: regular  Rate: normal  (-)  murmur     Dental - normal exam           Pulmonary - normal exam  Breath sounds clear to auscultation     Abdominal    Neurological - normal exam                   Anesthesia Plan    ASA 1       Plan - general       Airway plan will be LMA          Induction: intravenous    Postoperative Plan: Postoperative administration of opioids is intended.    Pertinent diagnostic labs and testing reviewed    Informed Consent:    Anesthetic plan and risks discussed with patient ( used).    Use of blood products discussed with: patient whom consented to blood products.

## 2024-11-27 NOTE — DISCHARGE INSTRUCTIONS
Instrucciones Para [place]  (Home Care Instructions)    ACTIVIDAD: Descanse y tome todo con mucha calma las primeras 24 horas después de bray cirugía.  Genie persona adulta responsable debe permanecer con usted alverto raj periodo de tiempo.  Es normal sentirse sonoliento o sonolienta alverto esas primeras horas.  Le recomendamos que no keon nada que requiera equilibrio, jeffery decisiones a mucha coordinación de bray parte.    NO KEON ESTO PURANTE LAS PRIMERAS 24 HORAS:   Manejar o conducir algún vehiculo, operar maquinarias o utilizar electrodomesticos.   Beber cerveza o algún otro tipo de bebida alcohólica.   Jeffery decisiones importantes o firmar documentos legales.      DIETA: Para evitar las nauseas, prosiga despacito con bray dieta a medida que pueda ir tolerándola mejor, evite comidas muy condimentadas o grasosas alverto raj primer día.  Vaya agregando comidas más substanciadas a bray dieta a medida que asi lo indique bray médica.  Los bebés pueden beber leche preparada o formula, ásl monse también leche del seno de la madre a medida que vayan teniendo hambre.  SIGA AGREGANDO LIQUIDOS Y COMIDAS CON FIBRA PARA EVITAR ESTREÑIMIENTO.    MONSE BAÑARSE Y CAMBIAR LOS VENDAJES DE LA CIRUGIA: se puede banar manana, wanda no menterese en la agua monse tinas o nadar hasta que el medico le diga que esta steph     MEDICAMENTOS/MEDICINAS:  Vuelva a jeffery missy medicamentos diarios.  [place] los medicamentos que se le prescribe con un poco de comida.  Si no le prescribe ningún tipo de medicamento, entonces puede jeffery medicinas para el dolor que no contienen aspirina, si las necesita.  LAS MEDICINAS PARA EL DOLOR PUEDEN ESTREÑIRLE MUCHO.  [place] un suavizante para el excremento o materia fecal (stool softener) o un laxativo monse por ejemplo: senokot, pericolase, o leche de magnesia, si lo necesita.    La prescripción la administro Tylenol y Ibuprofen.  La ultima dosis de medicina para el dolor fue administrada    Tylenol a las 2:30 PM, tome mas  Tylenol a las 8:30 PM  Toradol (medicina monique Ibuprofen/Motrin) a las 3:05 PM, tome mas Ibuprofen/Motrin a las 9:05 PM     Se debe hacer elio consulta medica con el doctor, Llame para hacer la leticia.    Usted debe LIAMAR A BRAY MEDICO si tiene los siguientes síntomas:   -   Elio fiebre más tank de 101 grados Fahrenheit.   -   Un dolor incesante aún con los medicamentos, o nauseas y vómito persistente.   -   Un sangrado excesivo (matheus que traspasa los vendajes o gasas) o algúln tipo de drenaje inesperado que proviene de la henda.     -   Un color nayak exagerado o hinchazón alrededor del área en donde se le hizo incisión o joslyn, o un drenaje de pus o con olor ganesh proveniente de la henda.   -    La inhabilidad de orinar o vaciar bray vejiga en 8 horas.   -    Problemas con a respiración o myah en el pecho.    Usted debe llamar al 911 si se presentan problemas con el dolor al respirar o el pecho.  Si no se puede ponnoer en comunicación con un medica o con el centro de cirugía, usted debe ir a la estación de emergencia (emergency room) más cercana o a un centro de atención de urgencia (urgent care center).      El teléfono del medico es: Dr. Pike @ [phone]    LOS SÍNTOMAS DE UN LEVE RESFRIO SON MUY NORMALES.  ADEMÁS USTED PUEDE LLEGAR A SENTIR MYAH GENERALES DE MÚSCULOS, IRRITACIÓN EN LA GARGANTA, MYAH DE JACIEL Y/O UN POCO DE NAUSEAS.    Sie tiene alguna pregunta, llame a bray médico.  Si bray médico no se encuentra disponible, por favor llame al Centro de Cirugía at [phone].  el Centro está abierto de Lunes a Viernes desde las 7:00 de la manana hasta las 5:00 de la noche.      Mi firma a continuación indica que he recibido y entiendco estas instrucciones acera de los cuidados en la casa (Home Care Instructions)    Usted recibirá elio encuesta en la correspondencia en las siguientes semanas y le pedimos que por favor tome un momento para completar zuri encuesta y regresaría a hosotros.  Nuestro objetivó  es brindarle un cuidado muy turpin y par lo tanto apreciamos missy coméntanos.  Muchas herminia por rut escogido el [facility] de [facility].

## 2024-11-29 NOTE — ANESTHESIA POSTPROCEDURE EVALUATION
Patient: Latisha Fried    Procedure Summary       Date: 24 Room / Location: [facility] ROOM 25 / SURGERY SAME DAY [facility]    Anesthesia Start: 1444 Anesthesia Stop: 1514    Procedure: DILATION AND CURETTAGE (Vagina ) Diagnosis: (MISSED )    Surgeons: Tarsha Pike M.D. Responsible Provider: Kailash Villasenor D.O.    Anesthesia Type: general ASA Status: 1            Final Anesthesia Type: general  Last vitals  BP   Blood Pressure: 112/58    Temp   36.4 °C (97.6 °F)    Pulse   64   Resp   18    SpO2   97 %      Anesthesia Post Evaluation    Patient location during evaluation: PACU  Patient participation: complete - patient participated  Level of consciousness: awake and alert    Airway patency: patent  Anesthetic complications: no  Cardiovascular status: hemodynamically stable  Respiratory status: acceptable  Hydration status: euvolemic    PONV: none          No notable events documented.     Nurse Pain Score: 0 (NPRS)

## 2024-12-11 ENCOUNTER — GYNECOLOGY VISIT (OUTPATIENT)
Dept: OBGYN | Facility: CLINIC | Age: 29
End: 2024-12-11
Payer: COMMERCIAL

## 2024-12-11 VITALS
BODY MASS INDEX: 26.58 KG/M2 | DIASTOLIC BLOOD PRESSURE: 68 MMHG | SYSTOLIC BLOOD PRESSURE: 94 MMHG | HEIGHT: 63 IN | WEIGHT: 150 LBS

## 2024-12-11 DIAGNOSIS — R10.31 RIGHT LOWER QUADRANT ABDOMINAL PAIN: ICD-10-CM

## 2024-12-11 DIAGNOSIS — Z30.09 ENCOUNTER FOR COUNSELING REGARDING CONTRACEPTION: ICD-10-CM

## 2024-12-11 DIAGNOSIS — Z98.890 POSTOPERATIVE STATE: Primary | ICD-10-CM

## 2024-12-11 DIAGNOSIS — O02.1 MISSED ABORTION: ICD-10-CM

## 2024-12-11 PROCEDURE — 3074F SYST BP LT 130 MM HG: CPT | Performed by: OBSTETRICS & GYNECOLOGY

## 2024-12-11 PROCEDURE — 99024 POSTOP FOLLOW-UP VISIT: CPT | Performed by: OBSTETRICS & GYNECOLOGY

## 2024-12-11 PROCEDURE — 3078F DIAST BP <80 MM HG: CPT | Performed by: OBSTETRICS & GYNECOLOGY

## 2024-12-11 RX ORDER — ACETAMINOPHEN AND CODEINE PHOSPHATE 120; 12 MG/5ML; MG/5ML
1 SOLUTION ORAL DAILY
Qty: 84 TABLET | Refills: 3 | Status: SHIPPED | OUTPATIENT
Start: 2024-12-11

## 2024-12-11 ASSESSMENT — FIBROSIS 4 INDEX: FIB4 SCORE: 0.47

## 2024-12-11 NOTE — PROGRESS NOTES
Patient here for GYN visit- D&C post op on 11/17 w/ Dr. Pike  Last seen on : 11/26 w/ you  Phone/Pharmacy verified: [phone]    Pt states she had some heavier bleeding w/ clots after D&C, but now it is spotting with brownish blood. She is having headaches and insomnia and is unsure if it is related. She is wanting to know what she can to for TTC going forward.

## 2024-12-11 NOTE — PROGRESS NOTES
"SUBJECTIVE:  Latisha Fried presents to the clinic 2 weeks for post-operative visits following D&E    Eating a regular diet without difficulty. Bowel movement are Normal.  The patient is not having any pain. Vaginal bleeding is now just spotting. has not had intercourse since surgery.     Is having headaches. Tried tylenol, not helping, Has a history of headaches like this. States she used to get vision symptoms with them and also is sensitive to light. Never been diagnosed with migraines.    Reports since her C/S, noted pain and \"movement of organs\" when she does a sit-up.  Not having pain currently, but worried about this.        OBJECTIVE:  LMP 10/04/2024 (Exact Date)   Current Outpatient Medications on File Prior to Visit   Medication Sig Dispense Refill    ibuprofen (MOTRIN) 600 MG Tab Take 1 Tablet by mouth every 6 hours as needed for Moderate Pain or Mild Pain. 20 Tablet 0    acetaminophen (TYLENOL) 325 MG Tab Take 2 Tablets by mouth every 6 hours as needed for Mild Pain or Moderate Pain. 20 Tablet 0    Prenatal MV-Min-Fe Fum-FA-DHA (PRENATAL 1 PO) Take  by mouth.       No current facility-administered medications on file prior to visit.       Constitutional:  alert, no distress.  Abdomen:  soft, bowel sounds active, non-tender except in the RLQ where she states she gets a buldge when she does a sit-up and feels like he \"organs are moving.\"     Lab:  Lab Results   Component Value Date/Time    WBC 6.8 11/13/2024 06:30 AM    RBC 4.72 11/13/2024 06:30 AM    HEMOGLOBIN 12.8 11/13/2024 06:30 AM    HEMATOCRIT 39.7 11/13/2024 06:30 AM    MCV 84.1 11/13/2024 06:30 AM    MCH 27.1 11/13/2024 06:30 AM    MCHC 32.2 11/13/2024 06:30 AM    MPV 9.4 11/13/2024 06:30 AM    NEUTSPOLYS 63.60 11/13/2024 06:30 AM    LYMPHOCYTES 28.70 11/13/2024 06:30 AM    MONOCYTES 5.70 11/13/2024 06:30 AM    EOSINOPHILS 1.30 11/13/2024 06:30 AM    BASOPHILS 0.30 11/13/2024 06:30 AM          Pathology:   Pathology Results (Last " "result in 90 days)                24 8198  Narrative:                                                                                                                       Pathology Specimen  [facility]                          DEPARTMENT OF PATHOLOGY                   [address] [place] [address]  [address]              Phone [phone]          Fax [phone]  Elo Brock M.D.     Elise No M.D.     Ana Kumar D.O.                            JERSEY King M.D. Arash Mohtashamian, M.D. Aaron G. Novotny, D.O.    Patient:    RICK JACOBSON        Specimen    [id number]              MERT                     #:      Copies to:                        SURGICAL PATHOLOGY CONSULTATION      FINAL DIAGNOSIS:    A. Products of conception:         Early chorionic villi, decidua, and endometrium.                                          Diagnosis performed by:                                      KERI MOORE, DO  ------------------------------------------------------------------------  ---------------------------------------------------------------  CODES: 2003x1    SPECIMEN(S)  A. Products of conception:    PREOPERATIVE DIAGNOSIS:  Missed , respectful disposition    POSTOPERATIVE DIAGNOSIS:  Missed , respectful disposition       GROSS DESCRIPTION:  A. Received in formalin labeled with the patient's name, medical record  number, and \"products of conception\" is a suction trap device which  holds an 8.0 x 5.0 x 1.0 cm aggregate of irregular tan-pink soft tissue  fragments mixed with blood.  Pale pink villous tissue is identified and  does not appear vesicular.  Fetal parts are not appreciated.  Representative sections to include villous tissue are submitted.  The  specimen is held for respectful disposition.  4 [id number]     MICROSCOPIC DESCRIPTION:  Microscopic examination was performed. "  Please see diagnosis.    Report electronically signed by:  KERI MOORE DO ,  Diagnosis performed at: [facility]  Pathology  Department, [address] [place]  [address]      Printed  [id number] RICK JACOBSON   Page 1 of 1 MRN#:[id number]                     ASSESSMENT & PLAN:  Rick Fried is a 29 y.o.  s/p D&C for missed aboprtion, recovering as expected.     # Post-op state s/p D&C for missed   - Recovering as expected  - Reviewed exercise recommendations to start light and increase slowly, listening to her body  - Avoid baths, tampons, intercourse until 2 weeks post-op  - Reviewed normal vaginal bleeding and symptoms in addition with expected return of menses 4-8 weeks post-op  - Reviewed return precautions of heavy VB, fever, severe abdominal pain, abnormal discharge, or no menses after 8 weeks    # Contraception counseling   - Current contraception: none (Post D&C)  - The various methods of appropriate contraception available were discussed in detail with the patient including the Mirena IUD, the ParaGard IUD, Nexplanon, oral contraceptive pills (MARAL and POP), the patch, the vaginal ring, and depo-provera.   After detailed discussion of the risks and benefits of these methods the patient decided that they would like to use Pills- plan POP's given likely migraines and with aura based on description of symptom.  Reviewed medical history and the patient has no contraindications.     - Contraceptive efficacy after 2 days of use, recommended back-up method for first 2 days  - Assessing pregnancy risk: Reasonable certainty that the patient is not pregnant based on: No vaginal intercourse since last certain menses/D&C for SAB  - Rx for Norethindrone 0.35 mg  provided.     # Possible Hernia  - Exam limited but symptoms c.w possible abdominal hernia. No evidence of acute process such as appendicitis, obstruction, etc..  - Referral to general  surgery  - Reviewed avoiding exercises that exacerbate symptoms and precautions for which to present to the ED.    All questions answered. The patient will call with questions or concerns.       RTC PRN or for annual      Tarsha Pike MD  Obstetrics and Gynecology

## 2025-02-15 NOTE — PROGRESS NOTES
gynPatient here for GYN visit - D&C in Nov 2024, pt having pain, burning, itching in vaginal area  Last seen on : 12/11/24 w/ Dr. Pike  LMP : Irregular now, on and off ever since procedure, 2/5/25  BCM : OCPs  Pap : Thinks it was June 2024 @ [facility] (NILM per pt)   Phone/Pharmacy verified: [phone]   ID # [id number]    Pt states she had some pain after procedure but now she is having burning and itching in her vaginal area

## 2025-02-18 ENCOUNTER — GYNECOLOGY VISIT (OUTPATIENT)
Dept: OBGYN | Facility: CLINIC | Age: 30
End: 2025-02-18
Payer: COMMERCIAL

## 2025-02-18 ENCOUNTER — HOSPITAL ENCOUNTER (OUTPATIENT)
Facility: MEDICAL CENTER | Age: 30
End: 2025-02-18
Attending: OBSTETRICS & GYNECOLOGY
Payer: COMMERCIAL

## 2025-02-18 VITALS
DIASTOLIC BLOOD PRESSURE: 75 MMHG | WEIGHT: 146.5 LBS | BODY MASS INDEX: 27.66 KG/M2 | HEIGHT: 61 IN | SYSTOLIC BLOOD PRESSURE: 114 MMHG

## 2025-02-18 DIAGNOSIS — R39.9 UTI SYMPTOMS: ICD-10-CM

## 2025-02-18 DIAGNOSIS — N89.8 VAGINAL ITCHING: ICD-10-CM

## 2025-02-18 DIAGNOSIS — R10.2 PELVIC PAIN: ICD-10-CM

## 2025-02-18 DIAGNOSIS — R39.9 UTI SYMPTOMS: Primary | ICD-10-CM

## 2025-02-18 LAB
APPEARANCE UR: CLEAR
APPEARANCE UR: CLEAR
BILIRUB UR QL STRIP.AUTO: NEGATIVE
BILIRUB UR STRIP-MCNC: NEGATIVE MG/DL
CANDIDA DNA VAG QL PROBE+SIG AMP: NEGATIVE
COLOR UR AUTO: YELLOW
COLOR UR: YELLOW
G VAGINALIS DNA VAG QL PROBE+SIG AMP: NEGATIVE
GLUCOSE UR STRIP.AUTO-MCNC: NEGATIVE MG/DL
GLUCOSE UR STRIP.AUTO-MCNC: NEGATIVE MG/DL
KETONES UR STRIP.AUTO-MCNC: NEGATIVE MG/DL
KETONES UR STRIP.AUTO-MCNC: NEGATIVE MG/DL
LEUKOCYTE ESTERASE UR QL STRIP.AUTO: NEGATIVE
LEUKOCYTE ESTERASE UR QL STRIP.AUTO: NEGATIVE
MICRO URNS: NORMAL
NITRITE UR QL STRIP.AUTO: NEGATIVE
NITRITE UR QL STRIP.AUTO: NEGATIVE
PH UR STRIP.AUTO: 6.5 [PH] (ref 5–8)
PH UR STRIP.AUTO: 6.5 [PH] (ref 5–8)
PROT UR QL STRIP: NEGATIVE MG/DL
PROT UR QL STRIP: NEGATIVE MG/DL
RBC UR QL AUTO: NEGATIVE
RBC UR QL AUTO: NEGATIVE
SP GR UR STRIP.AUTO: 1.01
SP GR UR STRIP.AUTO: 1.01
T VAGINALIS DNA VAG QL PROBE+SIG AMP: NEGATIVE
UROBILINOGEN UR STRIP-MCNC: 0.2 MG/DL
UROBILINOGEN UR STRIP.AUTO-MCNC: 0.2 EU/DL

## 2025-02-18 PROCEDURE — 3074F SYST BP LT 130 MM HG: CPT | Performed by: OBSTETRICS & GYNECOLOGY

## 2025-02-18 PROCEDURE — 3078F DIAST BP <80 MM HG: CPT | Performed by: OBSTETRICS & GYNECOLOGY

## 2025-02-18 PROCEDURE — 87480 CANDIDA DNA DIR PROBE: CPT

## 2025-02-18 PROCEDURE — 87491 CHLMYD TRACH DNA AMP PROBE: CPT

## 2025-02-18 PROCEDURE — 87510 GARDNER VAG DNA DIR PROBE: CPT

## 2025-02-18 PROCEDURE — 81003 URINALYSIS AUTO W/O SCOPE: CPT

## 2025-02-18 PROCEDURE — 87591 N.GONORRHOEAE DNA AMP PROB: CPT

## 2025-02-18 PROCEDURE — 87660 TRICHOMONAS VAGIN DIR PROBE: CPT

## 2025-02-18 PROCEDURE — 81002 URINALYSIS NONAUTO W/O SCOPE: CPT | Performed by: OBSTETRICS & GYNECOLOGY

## 2025-02-18 PROCEDURE — 99213 OFFICE O/P EST LOW 20 MIN: CPT | Mod: 24 | Performed by: OBSTETRICS & GYNECOLOGY

## 2025-02-18 RX ORDER — NITROFURANTOIN 25; 75 MG/1; MG/1
100 CAPSULE ORAL 2 TIMES DAILY
Qty: 10 CAPSULE | Refills: 0 | Status: SHIPPED | OUTPATIENT
Start: 2025-02-18 | End: 2025-02-23

## 2025-02-18 ASSESSMENT — FIBROSIS 4 INDEX: FIB4 SCORE: 0.47

## 2025-02-18 NOTE — PROGRESS NOTES
"GYN FOLLOW UP VISIT    CC:  Gynecologic Exam (Pain, burning, Itching )       HPI: Patient is a 29 y.o.  who presents for follow up for ***       ROS:   General: denies fever / chills  HEENT: denies sore throat:  CV: denies chest pain:  Repiratory: denies shortness of breath  GI: denies abdominal pain  : denies dysuria:    PFSH:  I personally reviewed the past medical and surgical histories. Any changes have been updated in the chart.      PHYSICAL EXAMINATION:  Vital Signs:   Vitals:    25 1032   Weight: 146 lb 8 oz   Height: 5' 1\"     Body mass index is 27.68 kg/m².    Gen: appears well, NAD  Respiratory: normal effort  Abdomen: Soft, non-tender.  Pelvic Exam:    Vulva: normal.    Urethra: normal.   Vagina: normal.    Cervix: normal.    Uterus: normal size, shape and contour.    left adnexa: normal.   right adnexa: normal.   Perineum: normal.    ASSESSMENT AND PLAN:  29 y.o.        1. UTI symptoms  ***  - POCT Urinalysis        Tarsha Pike MD  Obstetrics and Gynecology      "

## 2025-02-18 NOTE — PROGRESS NOTES
"GYN FOLLOW UP VISIT    CC:  Gynecologic Exam (Pain, burning, Itching )       HPI: Patient is a 29 y.o.  who presents for follow up for pelvic pain, dysuria, vaginal itching and vaginal discharge.    Regarding the dysuria, started about 2 weeks after  procedure.  Significant pain with urination.  Denies blood in urine.  Some urgency and frequency as well.  Fever, chills, back pain    Discharge is small amount, no bad odor. Also reports vulvar and vaginal itching and irritation.  Proved a little bit with over-the-counter treatment for vulvovaginal candidiasis, but still present.     Patient underwent D&C for 6-week missed  in 2024.  Started on Micronor at her follow-up visit. Reports menses are slightly irregular with this, but is happy that she is not getting headaches with it      ROS:   General: denies fever / chills  HEENT: denies sore throat:  CV: denies chest pain:  Repiratory: denies shortness of breath    PFSH:  I personally reviewed the past medical and surgical histories. Any changes have been updated in the chart.      PHYSICAL EXAMINATION:  Vital Signs:   Vitals:    25 1032   Weight: 146 lb 8 oz   Height: 5' 1\"     Body mass index is 27.68 kg/m².    Gen: appears well, NAD  Respiratory: normal effort  Abdomen: Soft, non-tender.  Pelvic Exam:    Vulva: normal.    Urethra: normal.  Thickened bladder tenderness with palpation   Vagina: normal.  Moderate amount of thick white to yellow vaginal discharge.  No odor   Cervix: normal.  Amount of mucus at external cervical os   Uterus: normal size, shape and contour.  Anteverted and nontender   left adnexa: normal.  Non-Tender   right adnexa: normal.  Non-Tender   Perineum: normal.      ASSESSMENT AND PLAN:  29 y.o.        1. UTI symptoms  2. Pelvic pain  - POCT Urinalysis  -Been significant dysuria, and tenderness of bladder on exam will treat with nitrofurantoin twice daily x 5 days  -Formal urinalysis and reflex to culture " sent  - GCCT collected, no CMT on exam    3. Vaginal itching  4. Vaginal discharge  - GCCT Pap collected  - VAGINAL PATHOGENS DNA PANEL; Future  - pending results we will treat as indicated      Tarsha Pkie MD  Obstetrics and Gynecology

## 2025-02-19 ENCOUNTER — RESULTS FOLLOW-UP (OUTPATIENT)
Dept: OBGYN | Facility: CLINIC | Age: 30
End: 2025-02-19
Payer: COMMERCIAL

## 2025-02-19 LAB
C TRACH DNA GENITAL QL NAA+PROBE: NEGATIVE
N GONORRHOEA DNA GENITAL QL NAA+PROBE: NEGATIVE
SPECIMEN SOURCE: NORMAL

## 2025-08-21 ENCOUNTER — OFFICE VISIT (OUTPATIENT)
Dept: URGENT CARE | Facility: CLINIC | Age: 30
End: 2025-08-21
Payer: COMMERCIAL

## 2025-08-21 ENCOUNTER — APPOINTMENT (OUTPATIENT)
Dept: RADIOLOGY | Facility: IMAGING CENTER | Age: 30
End: 2025-08-21
Attending: FAMILY MEDICINE
Payer: COMMERCIAL

## 2025-08-21 ENCOUNTER — OCCUPATIONAL MEDICINE (OUTPATIENT)
Dept: URGENT CARE | Facility: CLINIC | Age: 30
End: 2025-08-21
Payer: COMMERCIAL

## 2025-08-21 ENCOUNTER — APPOINTMENT (OUTPATIENT)
Dept: URGENT CARE | Facility: CLINIC | Age: 30
End: 2025-08-21
Payer: OTHER MISCELLANEOUS

## 2025-08-21 VITALS
WEIGHT: 144 LBS | OXYGEN SATURATION: 99 % | SYSTOLIC BLOOD PRESSURE: 118 MMHG | TEMPERATURE: 97.4 F | RESPIRATION RATE: 18 BRPM | DIASTOLIC BLOOD PRESSURE: 62 MMHG | HEART RATE: 56 BPM | HEIGHT: 63 IN | BODY MASS INDEX: 25.52 KG/M2

## 2025-08-21 DIAGNOSIS — S20.229A CONTUSION OF UPPER BACK, UNSPECIFIED LATERALITY, INITIAL ENCOUNTER: ICD-10-CM

## 2025-08-21 DIAGNOSIS — S19.9XXA INJURY OF NECK, INITIAL ENCOUNTER: ICD-10-CM

## 2025-08-21 DIAGNOSIS — S09.90XA CLOSED HEAD INJURY, INITIAL ENCOUNTER: Primary | ICD-10-CM

## 2025-08-21 DIAGNOSIS — S06.0X1A CONCUSSION WITH LOSS OF CONSCIOUSNESS OF 30 MINUTES OR LESS, INITIAL ENCOUNTER: ICD-10-CM

## 2025-08-21 PROCEDURE — 99204 OFFICE O/P NEW MOD 45 MIN: CPT | Performed by: FAMILY MEDICINE

## 2025-08-21 PROCEDURE — 3074F SYST BP LT 130 MM HG: CPT | Performed by: FAMILY MEDICINE

## 2025-08-21 PROCEDURE — 3078F DIAST BP <80 MM HG: CPT | Performed by: FAMILY MEDICINE

## 2025-08-21 PROCEDURE — 72040 X-RAY EXAM NECK SPINE 2-3 VW: CPT | Mod: TC,FY | Performed by: RADIOLOGY

## 2025-08-21 RX ORDER — NAPROXEN 500 MG/1
TABLET ORAL
Qty: 14 TABLET | Refills: 0 | Status: SHIPPED | OUTPATIENT
Start: 2025-08-21

## 2025-08-21 ASSESSMENT — ENCOUNTER SYMPTOMS
MYALGIAS: 1
NECK PAIN: 1

## 2025-08-21 ASSESSMENT — FIBROSIS 4 INDEX: FIB4 SCORE: 0.48

## 2025-08-22 ENCOUNTER — HOSPITAL ENCOUNTER (OUTPATIENT)
Dept: RADIOLOGY | Facility: MEDICAL CENTER | Age: 30
End: 2025-08-22
Attending: FAMILY MEDICINE
Payer: OTHER MISCELLANEOUS

## 2025-08-22 ENCOUNTER — TELEPHONE (OUTPATIENT)
Dept: URGENT CARE | Facility: CLINIC | Age: 30
End: 2025-08-22
Payer: OTHER MISCELLANEOUS

## 2025-08-22 DIAGNOSIS — S09.90XA CLOSED HEAD INJURY, INITIAL ENCOUNTER: ICD-10-CM

## 2025-08-22 DIAGNOSIS — S06.0X1A CONCUSSION WITH LOSS OF CONSCIOUSNESS OF 30 MINUTES OR LESS, INITIAL ENCOUNTER: ICD-10-CM

## 2025-08-22 DIAGNOSIS — S20.229A CONTUSION OF UPPER BACK, UNSPECIFIED LATERALITY, INITIAL ENCOUNTER: ICD-10-CM

## 2025-08-22 PROCEDURE — 72128 CT CHEST SPINE W/O DYE: CPT

## 2025-08-22 PROCEDURE — 70450 CT HEAD/BRAIN W/O DYE: CPT

## 2025-08-29 ENCOUNTER — OCCUPATIONAL MEDICINE (OUTPATIENT)
Dept: OCCUPATIONAL MEDICINE | Facility: CLINIC | Age: 30
End: 2025-08-29
Payer: COMMERCIAL

## 2025-08-29 VITALS
SYSTOLIC BLOOD PRESSURE: 112 MMHG | OXYGEN SATURATION: 98 % | WEIGHT: 144 LBS | DIASTOLIC BLOOD PRESSURE: 70 MMHG | BODY MASS INDEX: 25.51 KG/M2 | HEART RATE: 80 BPM | TEMPERATURE: 96.9 F

## 2025-08-29 DIAGNOSIS — S29.019D THORACIC MYOFASCIAL STRAIN, SUBSEQUENT ENCOUNTER: Primary | ICD-10-CM

## 2025-08-29 DIAGNOSIS — S39.012D STRAIN OF LUMBAR REGION, SUBSEQUENT ENCOUNTER: ICD-10-CM

## 2025-08-29 DIAGNOSIS — S06.0X0D CONCUSSION WITHOUT LOSS OF CONSCIOUSNESS, SUBSEQUENT ENCOUNTER: ICD-10-CM

## 2025-08-29 ASSESSMENT — FIBROSIS 4 INDEX: FIB4 SCORE: 0.48

## (undated) DEVICE — GLOVE BIOGEL INDICATOR SZ 6.5 SURGICAL PF LTX - (50PR/BX 4BX/CA)

## (undated) DEVICE — PAD SANITARY 11IN MAXI IND WRAPPED (12EA/PK 24PK/CA)

## (undated) DEVICE — SET LEADWIRE 5 LEAD BEDSIDE DISPOSABLE ECG (1SET OF 5/EA)

## (undated) DEVICE — KIT  I.V. START (100EA/CA)

## (undated) DEVICE — CANISTER SUCTION 3000ML MECHANICAL FILTER AUTO SHUTOFF MEDI-VAC NONSTERILE LF DISP (40EA/CA)

## (undated) DEVICE — CANISTER SUCTION RIGID RED 1500CC (40EA/CA)

## (undated) DEVICE — KIT D & C COLLECTION (10EA/PK)

## (undated) DEVICE — GOWN WARMING STANDARD FLEX - (30/CA)

## (undated) DEVICE — ELECTRODE DUAL RETURN W/ CORD - (50/PK)

## (undated) DEVICE — TUBE CONNECTING SUCTION - CLEAR PLASTIC STERILE 72 IN (50EA/CA)

## (undated) DEVICE — MASK OXYGEN VNYL ADLT MED CONC WITH 7 FOOT TUBING - (50EA/CA)

## (undated) DEVICE — SENSOR OXIMETER ADULT SPO2 RD SET (20EA/BX)

## (undated) DEVICE — SUCTION INSTRUMENT YANKAUER BULBOUS TIP W/O VENT (50EA/CA)

## (undated) DEVICE — GLOVE BIOGEL SZ 6 PF LATEX - (50EA/BX 4BX/CA)

## (undated) DEVICE — WATER IRRIGATION STERILE 1000ML (12EA/CA)

## (undated) DEVICE — LACTATED RINGERS INJ 1000 ML - (14EA/CA 60CA/PF)

## (undated) DEVICE — TUBING D & E COLLECTION SET (50EA/PK)

## (undated) DEVICE — Device

## (undated) DEVICE — SODIUM CHL IRRIGATION 0.9% 1000ML (12EA/CA)

## (undated) DEVICE — SLEEVE VASO DVT COMPRESSION CALF MED - (10PR/CA)

## (undated) DEVICE — CANNULA O2 COMFORT SOFT EAR ADULT 7 FT TUBING (50/CA)

## (undated) DEVICE — TUBING CLEARLINK DUO-VENT - C-FLO (48EA/CA)

## (undated) DEVICE — TOWEL STOP TIMEOUT SAFETY FLAG (40EA/CA)

## (undated) DEVICE — VACURETTE 8MM CURVED 10/PKG